# Patient Record
Sex: FEMALE | Race: WHITE | Employment: OTHER | URBAN - NONMETROPOLITAN AREA
[De-identification: names, ages, dates, MRNs, and addresses within clinical notes are randomized per-mention and may not be internally consistent; named-entity substitution may affect disease eponyms.]

---

## 2020-09-15 ENCOUNTER — NURSE ONLY (OUTPATIENT)
Dept: LAB | Age: 75
End: 2020-09-15

## 2020-11-13 ENCOUNTER — ANESTHESIA EVENT (OUTPATIENT)
Dept: OPERATING ROOM | Age: 75
End: 2020-11-13
Payer: MEDICARE

## 2020-11-13 ENCOUNTER — HOSPITAL ENCOUNTER (OUTPATIENT)
Age: 75
Setting detail: OUTPATIENT SURGERY
Discharge: HOME | End: 2020-11-13
Attending: SPECIALIST | Admitting: SPECIALIST
Payer: MEDICARE

## 2020-11-13 ENCOUNTER — ANESTHESIA (OUTPATIENT)
Dept: OPERATING ROOM | Age: 75
End: 2020-11-13
Payer: MEDICARE

## 2020-11-13 VITALS
RESPIRATION RATE: 1 BRPM | OXYGEN SATURATION: 100 % | DIASTOLIC BLOOD PRESSURE: 85 MMHG | SYSTOLIC BLOOD PRESSURE: 156 MMHG

## 2020-11-13 VITALS
HEART RATE: 89 BPM | DIASTOLIC BLOOD PRESSURE: 57 MMHG | BODY MASS INDEX: 24.8 KG/M2 | OXYGEN SATURATION: 96 % | SYSTOLIC BLOOD PRESSURE: 116 MMHG | TEMPERATURE: 97.1 F | RESPIRATION RATE: 14 BRPM | WEIGHT: 158 LBS | HEIGHT: 67 IN

## 2020-11-13 PROCEDURE — 2500000003 HC RX 250 WO HCPCS: Performed by: NURSE ANESTHETIST, CERTIFIED REGISTERED

## 2020-11-13 PROCEDURE — 3600000012 HC SURGERY LEVEL 2 ADDTL 15MIN: Performed by: SPECIALIST

## 2020-11-13 PROCEDURE — 3600000002 HC SURGERY LEVEL 2 BASE: Performed by: SPECIALIST

## 2020-11-13 PROCEDURE — 6370000000 HC RX 637 (ALT 250 FOR IP): Performed by: SPECIALIST

## 2020-11-13 PROCEDURE — 7100000010 HC PHASE II RECOVERY - FIRST 15 MIN: Performed by: SPECIALIST

## 2020-11-13 PROCEDURE — 3700000001 HC ADD 15 MINUTES (ANESTHESIA): Performed by: SPECIALIST

## 2020-11-13 PROCEDURE — 7100000001 HC PACU RECOVERY - ADDTL 15 MIN: Performed by: SPECIALIST

## 2020-11-13 PROCEDURE — 2500000003 HC RX 250 WO HCPCS: Performed by: SPECIALIST

## 2020-11-13 PROCEDURE — 7100000000 HC PACU RECOVERY - FIRST 15 MIN: Performed by: SPECIALIST

## 2020-11-13 PROCEDURE — 7100000011 HC PHASE II RECOVERY - ADDTL 15 MIN: Performed by: SPECIALIST

## 2020-11-13 PROCEDURE — 6360000002 HC RX W HCPCS: Performed by: NURSE ANESTHETIST, CERTIFIED REGISTERED

## 2020-11-13 PROCEDURE — 6370000000 HC RX 637 (ALT 250 FOR IP)

## 2020-11-13 PROCEDURE — 3700000000 HC ANESTHESIA ATTENDED CARE: Performed by: SPECIALIST

## 2020-11-13 PROCEDURE — 88305 TISSUE EXAM BY PATHOLOGIST: CPT

## 2020-11-13 PROCEDURE — 2709999900 HC NON-CHARGEABLE SUPPLY: Performed by: SPECIALIST

## 2020-11-13 PROCEDURE — 2580000003 HC RX 258: Performed by: SPECIALIST

## 2020-11-13 RX ORDER — PRAVASTATIN SODIUM 20 MG
20 TABLET ORAL DAILY
COMMUNITY

## 2020-11-13 RX ORDER — GINSENG 100 MG
CAPSULE ORAL PRN
Status: DISCONTINUED | OUTPATIENT
Start: 2020-11-13 | End: 2020-11-13 | Stop reason: ALTCHOICE

## 2020-11-13 RX ORDER — EPHEDRINE SULFATE/0.9% NACL/PF 50 MG/5 ML
SYRINGE (ML) INTRAVENOUS PRN
Status: DISCONTINUED | OUTPATIENT
Start: 2020-11-13 | End: 2020-11-13 | Stop reason: SDUPTHER

## 2020-11-13 RX ORDER — FENTANYL CITRATE 50 UG/ML
INJECTION, SOLUTION INTRAMUSCULAR; INTRAVENOUS PRN
Status: DISCONTINUED | OUTPATIENT
Start: 2020-11-13 | End: 2020-11-13 | Stop reason: SDUPTHER

## 2020-11-13 RX ORDER — LIDOCAINE HYDROCHLORIDE AND EPINEPHRINE 10; 10 MG/ML; UG/ML
INJECTION, SOLUTION INFILTRATION; PERINEURAL PRN
Status: DISCONTINUED | OUTPATIENT
Start: 2020-11-13 | End: 2020-11-13 | Stop reason: ALTCHOICE

## 2020-11-13 RX ORDER — CHLORAL HYDRATE 500 MG
CAPSULE ORAL
COMMUNITY

## 2020-11-13 RX ORDER — ONDANSETRON 2 MG/ML
INJECTION INTRAMUSCULAR; INTRAVENOUS PRN
Status: DISCONTINUED | OUTPATIENT
Start: 2020-11-13 | End: 2020-11-13 | Stop reason: SDUPTHER

## 2020-11-13 RX ORDER — LIDOCAINE HYDROCHLORIDE 20 MG/ML
INJECTION, SOLUTION EPIDURAL; INFILTRATION; INTRACAUDAL; PERINEURAL PRN
Status: DISCONTINUED | OUTPATIENT
Start: 2020-11-13 | End: 2020-11-13 | Stop reason: SDUPTHER

## 2020-11-13 RX ORDER — PROPOFOL 10 MG/ML
INJECTION, EMULSION INTRAVENOUS PRN
Status: DISCONTINUED | OUTPATIENT
Start: 2020-11-13 | End: 2020-11-13 | Stop reason: SDUPTHER

## 2020-11-13 RX ORDER — CEFADROXIL 500 MG/1
500 CAPSULE ORAL 2 TIMES DAILY
COMMUNITY

## 2020-11-13 RX ORDER — SUCCINYLCHOLINE/SOD CL,ISO/PF 200MG/10ML
SYRINGE (ML) INTRAVENOUS PRN
Status: DISCONTINUED | OUTPATIENT
Start: 2020-11-13 | End: 2020-11-13 | Stop reason: SDUPTHER

## 2020-11-13 RX ORDER — CLINDAMYCIN PHOSPHATE 600 MG/50ML
INJECTION INTRAVENOUS PRN
Status: DISCONTINUED | OUTPATIENT
Start: 2020-11-13 | End: 2020-11-13 | Stop reason: SDUPTHER

## 2020-11-13 RX ORDER — TRAMADOL HYDROCHLORIDE 50 MG/1
50 TABLET ORAL ONCE
Status: COMPLETED | OUTPATIENT
Start: 2020-11-13 | End: 2020-11-13

## 2020-11-13 RX ORDER — SODIUM CHLORIDE 9 MG/ML
INJECTION, SOLUTION INTRAVENOUS
Status: COMPLETED | OUTPATIENT
Start: 2020-11-13 | End: 2020-11-13

## 2020-11-13 RX ORDER — CALCIUM CARBONATE 500(1250)
500 TABLET ORAL DAILY
COMMUNITY

## 2020-11-13 RX ORDER — PHENYLEPHRINE HYDROCHLORIDE 10 MG/ML
INJECTION INTRAVENOUS PRN
Status: DISCONTINUED | OUTPATIENT
Start: 2020-11-13 | End: 2020-11-13 | Stop reason: SDUPTHER

## 2020-11-13 RX ORDER — TRAMADOL HYDROCHLORIDE 50 MG/1
TABLET ORAL
Status: COMPLETED
Start: 2020-11-13 | End: 2020-11-13

## 2020-11-13 RX ORDER — PHENOBARBITAL 15 MG/1
100 TABLET ORAL ONCE
COMMUNITY

## 2020-11-13 RX ORDER — AMPICILLIN TRIHYDRATE 250 MG
CAPSULE ORAL
COMMUNITY

## 2020-11-13 RX ORDER — LISINOPRIL 20 MG/1
20 TABLET ORAL DAILY
COMMUNITY

## 2020-11-13 RX ORDER — OMEGA-3S/DHA/EPA/FISH OIL/D3 300MG-1000
400 CAPSULE ORAL DAILY
COMMUNITY

## 2020-11-13 RX ADMIN — FENTANYL CITRATE 25 MCG: 50 INJECTION, SOLUTION INTRAMUSCULAR; INTRAVENOUS at 11:35

## 2020-11-13 RX ADMIN — SODIUM CHLORIDE: 9 INJECTION, SOLUTION INTRAVENOUS at 11:02

## 2020-11-13 RX ADMIN — CLINDAMYCIN PHOSPHATE 600 MG: 600 INJECTION, SOLUTION INTRAVENOUS at 11:10

## 2020-11-13 RX ADMIN — PHENYLEPHRINE HYDROCHLORIDE 150 MCG: 10 INJECTION INTRAVENOUS at 11:22

## 2020-11-13 RX ADMIN — TRAMADOL HYDROCHLORIDE 50 MG: 50 TABLET ORAL at 13:15

## 2020-11-13 RX ADMIN — Medication 10 MG: at 11:27

## 2020-11-13 RX ADMIN — LIDOCAINE HYDROCHLORIDE 50 MG: 20 INJECTION, SOLUTION EPIDURAL; INFILTRATION; INTRACAUDAL; PERINEURAL at 11:04

## 2020-11-13 RX ADMIN — Medication 120 MG: at 11:05

## 2020-11-13 RX ADMIN — PROPOFOL 160 MG: 10 INJECTION, EMULSION INTRAVENOUS at 11:04

## 2020-11-13 RX ADMIN — ONDANSETRON HYDROCHLORIDE 4 MG: 4 INJECTION, SOLUTION INTRAMUSCULAR; INTRAVENOUS at 11:27

## 2020-11-13 RX ADMIN — Medication 10 MG: at 11:30

## 2020-11-13 RX ADMIN — PHENYLEPHRINE HYDROCHLORIDE 150 MCG: 10 INJECTION INTRAVENOUS at 11:24

## 2020-11-13 RX ADMIN — FENTANYL CITRATE 50 MCG: 50 INJECTION, SOLUTION INTRAMUSCULAR; INTRAVENOUS at 11:04

## 2020-11-13 SDOH — HEALTH STABILITY: MENTAL HEALTH: HOW OFTEN DO YOU HAVE A DRINK CONTAINING ALCOHOL?: NEVER

## 2020-11-13 ASSESSMENT — PULMONARY FUNCTION TESTS
PIF_VALUE: 12
PIF_VALUE: 3
PIF_VALUE: 1
PIF_VALUE: 13
PIF_VALUE: 18
PIF_VALUE: 13
PIF_VALUE: 18
PIF_VALUE: 12
PIF_VALUE: 0
PIF_VALUE: 12
PIF_VALUE: 34
PIF_VALUE: 14
PIF_VALUE: 19
PIF_VALUE: 13
PIF_VALUE: 13
PIF_VALUE: 1
PIF_VALUE: 13
PIF_VALUE: 9
PIF_VALUE: 12
PIF_VALUE: 1
PIF_VALUE: 13
PIF_VALUE: 12
PIF_VALUE: 9
PIF_VALUE: 2
PIF_VALUE: 3
PIF_VALUE: 15
PIF_VALUE: 12
PIF_VALUE: 12
PIF_VALUE: 4
PIF_VALUE: 12
PIF_VALUE: 12
PIF_VALUE: 16
PIF_VALUE: 12
PIF_VALUE: 13
PIF_VALUE: 12
PIF_VALUE: 2
PIF_VALUE: 12
PIF_VALUE: 13
PIF_VALUE: 13
PIF_VALUE: 1
PIF_VALUE: 12
PIF_VALUE: 13
PIF_VALUE: 12
PIF_VALUE: 0
PIF_VALUE: 15
PIF_VALUE: 16
PIF_VALUE: 1
PIF_VALUE: 12
PIF_VALUE: 10
PIF_VALUE: 12
PIF_VALUE: 10
PIF_VALUE: 13
PIF_VALUE: 13
PIF_VALUE: 20

## 2020-11-13 ASSESSMENT — PAIN - FUNCTIONAL ASSESSMENT: PAIN_FUNCTIONAL_ASSESSMENT: 0-10

## 2020-11-13 ASSESSMENT — PAIN SCALES - GENERAL: PAINLEVEL_OUTOF10: 4

## 2020-11-13 NOTE — ANESTHESIA PRE PROCEDURE
Department of Anesthesiology  Preprocedure Note       Name:  Shaista Hawk   Age:  66 y.o.  :  1945                                          MRN:  [id number]         Date:  2020      Surgeon: Ashley Gar):  Violeta Quiles MD    Procedure: Procedure(s):  MOHS REPAIR SCC RIGHT PARIETAL SCALP AND BX LEFT LATERAL LOWER CHEEK    Medications prior to admission:   Prior to Admission medications    Medication Sig Start Date End Date Taking? Authorizing Provider   PHENobarbital (LUMINAL) 15 MG tablet Take 100 mg by mouth once. Yes Historical Provider, MD   lisinopril (PRINIVIL;ZESTRIL) 20 MG tablet Take 20 mg by mouth daily   Yes Historical Provider, MD   pravastatin (PRAVACHOL) 20 MG tablet Take 20 mg by mouth daily   Yes Historical Provider, MD   vitamin D3 (CHOLECALCIFEROL) 10 MCG (400 UNIT) TABS tablet Take 400 Units by mouth daily   Yes Historical Provider, MD   Duke-3 1000 MG CAPS Take by mouth   Yes Historical Provider, MD   calcium carbonate (OSCAL) 500 MG TABS tablet Take 500 mg by mouth daily   Yes Historical Provider, MD   Red Yeast Rice 600 MG CAPS Take by mouth   Yes Historical Provider, MD   cefadroxil (DURICEF) 500 MG capsule Take 500 mg by mouth 2 times daily   Yes Historical Provider, MD       Current medications:    Current Facility-Administered Medications   Medication Dose Route Frequency Provider Last Rate Last Dose    0.9 % sodium chloride infusion   Intravenous On Call to 56 Rutanja Gupta MD        ceFAZolin (ANCEF) 2 g in dextrose 5 % 50 mL IVPB  2 g Intravenous On Call to 56 Rutanja Gupta MD           Allergies: Allergies   Allergen Reactions    Dilantin [Phenytoin] Swelling    Penicillins     Sulfa Antibiotics     Vicodin [Hydrocodone-Acetaminophen]        Problem List:  There is no problem list on file for this patient.       Past Medical History:        Diagnosis Date    Arthritis     Epilepsy ([facility] [address]     Hyperlipidemia     Hypertension        Past Surgical History:        Procedure Laterality Date    CHOLECYSTECTOMY      COLONOSCOPY      HYSTERECTOMY         Social History:    Social History     Tobacco Use    Smoking status: Never Smoker    Smokeless tobacco: Never Used   Substance Use Topics    Alcohol use: Not Currently     Frequency: Never                                Counseling given: Not Answered      Vital Signs (Current):   Vitals:    11/13/20 0945   BP: (!) 148/70   Pulse: 104   Resp: 16   Temp: 97.3 °F (36.3 °C)   TempSrc: Temporal   SpO2: 97%   Weight: 158 lb (71.7 kg)   Height: 5' 7\" (1.702 m)                                              BP Readings from Last 3 Encounters:   11/13/20 (!) 148/70       NPO Status: Time of last liquid consumption: 1800                        Time of last solid consumption: 1800                        Date of last liquid consumption: 11/12/20                        Date of last solid food consumption: 11/12/20    BMI:   Wt Readings from Last 3 Encounters:   11/13/20 158 lb (71.7 kg)     Body mass index is 24.75 kg/m². CBC:   Lab Results   Component Value Date    WBC 5.0 11/08/2011    RBC 3.43 11/08/2011    HCT 38.2 11/09/2011    .0 11/08/2011    RDW 13.8 11/08/2011     11/08/2011       CMP:   Lab Results   Component Value Date     11/08/2011    K 3.9 11/08/2011     11/08/2011    CO2 25 11/08/2011    BUN 5 11/08/2011    CREATININE 0.4 11/08/2011    LABGLOM >90 11/08/2011    GLUCOSE 116 11/08/2011    PROT 6.8 11/08/2011    CALCIUM 9.5 11/08/2011    BILITOT 0.3 11/08/2011    ALKPHOS 79 11/08/2011    AST 21 11/08/2011    ALT 7 11/08/2011       POC Tests: No results for input(s): POCGLU, POCNA, POCK, POCCL, POCBUN, POCHEMO, POCHCT in the last 72 hours.     Coags: No results found for: PROTIME, INR, APTT    HCG (If Applicable): No results found for: PREGTESTUR, PREGSERUM, HCG, HCGQUANT     ABGs: No results found for: PHART, PO2ART, URJ8FFN, APG8HRE, BEART, D7NSNXNR     Type & Screen (If Applicable):  No results found for: LABABO, LABRH    Drug/Infectious Status (If Applicable):  No results found for: HIV, HEPCAB    COVID-19 Screening (If Applicable): No results found for: COVID19      Anesthesia Evaluation    Airway: Mallampati: II       Mouth opening: > = 3 FB Dental:          Pulmonary:                              Cardiovascular:    (+) hypertension:,                   Neuro/Psych:               GI/Hepatic/Renal:             Endo/Other:                     Abdominal:           Vascular:                                        Anesthesia Plan      general     ASA 2       Induction: intravenous. Anesthetic plan and risks discussed with patient. Plan discussed with CRNA.                   Migue Travis MD   11/13/2020

## 2020-11-13 NOTE — PROGRESS NOTES
0- Pt arrived to PACU Laurence Mineshlise JULIA and Kelli Longo RN, pt hooked up to monitor, VSS, pt breathing deeply on room air. SCD s hooked up. Pt has ace wrap dressing to her head dry and intact pt has band aid to left cheek on her face dry and intact. 1204- PT denies pain  1207- Pt doing well denies needs, updated be here about 20 min then will move to phase 2.  1214- Pt doing well denies needs. 1217- Pt given more ice chips. 1218- Joanne SUÁREZ at bedside discussed prescribing pain med tramadol for pain  1228- Pt meets discharge criteria from phase 1  1229- PT to phase 2.   1255- Pt doing well denies needs, encouraged to eat a cracker Joanne wanted her to try a pain pill before she leaves. 1257- Pt updated head was not shaved per Mirian Marquez in [address].  1340- Pt doing well, tolerating PO pain pill with no issues. Head still little sore. 1341- IV removed pt getting dressed. 1349- PT given discharge instructions verbalized understanding.  Last dose of pain meds given and wrote on AVS.  1353- Pt discharged taken to car in wheelchair with this RN

## 2020-11-13 NOTE — OP NOTE
Operative Note    Patient name: [address] [place] [address] Record Number: [id number]    Primary Care Physician: Georgette ROBBINS 1945    Date of Procedure: 2020    Pre-operative Diagnosis:  1.  27cm2 defect of right parietal area s/p MOHS for basal cell carcinoma       2.  supsicious lesion of left lower cheek    Post-operative Diagnosis: Same    Procedure Performed:  1. Repair of right parietal defect with an adjacent tissue transfer (200 cm2) ([id number] & 14302 X 5)       2.  1cm shave biopsy of left lower cheek    Surgeons/Assistants: Dr. Naomia Opitz PA-C    Estimated Blood Loss: 73SW     Complications: none immediately appreciated    Procedure: With the patient lying in the supine position and under adequate anesthesia per the anesthesia team, the area was anesthetized with a total of 17 ml of 1% Lidocaine 1:100,000 with epinephrine solution. The area was then prepped and draped in the standard surgical fashion. This was a very sizeable defect, which could not be closed primarily. Therefore, an adjacent tissue transfer (bilateral rotation flaps) were then designed, elevated and inset with skin staples. The Burrow's triangles were resected to prevent dog-ear deformity and final closure was completed using bacitracin and bulky sterile dressings. The left lower cheek shave biopsy was performed and then covered with bacitracin/bandaid. The patient tolerated the procedure quite well and remained hemodynamically stable throughout the procedure and was quite comfortable throughout the operative course.     Clinical staging for cancer cases:  Ct  Cn  Cm    Benjiman Setting  Electronically signed by me on 2020 at 11:59 AM  Operative Note      Patient: Frida Nettles  YOB: 1945  MRN: [id number]    Date of Procedure: 2020    Pre-Op Diagnosis: SCC RIGHT PARIETAL SCALP    Post-Op Diagnosis: Same       Procedure(s):  MOHS REPAIR SCC RIGHT PARIETAL SCALP AND BX LEFT LATERAL LOWER CHEEK    Surgeon(s):  Anika Henson MD    Assistant:   Physician Assistant: Kristy Phelan PA-C    Anesthesia: General    Estimated Blood Loss (mL): Minimal    Complications: None    Specimens:   ID Type Source Tests Collected by Time Destination   A : Left lateral lower cheek biopsy Tissue Cheek SURGICAL PATHOLOGY Anika Henson MD 11/13/2020 1028        Implants:  * No implants in log *      Drains: * No LDAs found *    Findings: 1.  27cm2 defect of right parietal area s/p MOHS for basal cell carcinoma       2.  supsicious lesion of left lower cheek    Detailed Description of Procedure:   1.   Repair of right parietal defect with an adjacent tissue transfer (200 cm2) ([id number] & 14302 X 5)       2.  1cm shave biopsy of left lower cheek    Electronically signed by Anika Henson MD on 11/13/2020 at 11:59 AM

## 2021-04-02 ENCOUNTER — HOSPITAL ENCOUNTER (OUTPATIENT)
Age: 76
Setting detail: OUTPATIENT SURGERY
Discharge: HOME | End: 2021-04-02
Attending: SPECIALIST | Admitting: SPECIALIST
Payer: MEDICARE

## 2021-04-02 VITALS
RESPIRATION RATE: 12 BRPM | OXYGEN SATURATION: 99 % | HEART RATE: 80 BPM | SYSTOLIC BLOOD PRESSURE: 130 MMHG | WEIGHT: 158 LBS | TEMPERATURE: 97.5 F | BODY MASS INDEX: 26.33 KG/M2 | DIASTOLIC BLOOD PRESSURE: 60 MMHG | HEIGHT: 65 IN

## 2021-04-02 PROCEDURE — 3600000002 HC SURGERY LEVEL 2 BASE: Performed by: SPECIALIST

## 2021-04-02 PROCEDURE — 2500000003 HC RX 250 WO HCPCS: Performed by: SPECIALIST

## 2021-04-02 PROCEDURE — 7100000011 HC PHASE II RECOVERY - ADDTL 15 MIN: Performed by: SPECIALIST

## 2021-04-02 PROCEDURE — 7100000010 HC PHASE II RECOVERY - FIRST 15 MIN: Performed by: SPECIALIST

## 2021-04-02 PROCEDURE — 2709999900 HC NON-CHARGEABLE SUPPLY: Performed by: SPECIALIST

## 2021-04-02 PROCEDURE — 3600000012 HC SURGERY LEVEL 2 ADDTL 15MIN: Performed by: SPECIALIST

## 2021-04-02 RX ORDER — LIDOCAINE HYDROCHLORIDE AND EPINEPHRINE 10; 10 MG/ML; UG/ML
INJECTION, SOLUTION INFILTRATION; PERINEURAL PRN
Status: DISCONTINUED | OUTPATIENT
Start: 2021-04-02 | End: 2021-04-02 | Stop reason: ALTCHOICE

## 2021-04-02 ASSESSMENT — PAIN SCALES - GENERAL: PAINLEVEL_OUTOF10: 0

## 2021-04-02 NOTE — PROGRESS NOTES
1322-  Patient arrived to phase II via via chair. Spontaneous respirations even and unlabored. Placed on monitor--VSS. Report received from [facility].    2520-  Assessment completed. Patient is alert and oriented x4. IV capped off-- no complications. ACE wrap to patient's head--clean, dry, and intact. Patient denies pain--will monitor. 1325-  Patient denies snack or drink. Family in room. 1335-  Patient dressing. 1340-  Discharge instructions given. Understanding verbalized. 1345-  Patient dressing. [phone]-  Patient discharged in stable condition with all belongings.

## 2021-04-02 NOTE — OP NOTE
Operative Note    Patient name: [address] [place] [address] Record Number: [id number]    Primary Care Physician: Kevin ROBBINS 1945    Date of Procedure: 2021    Pre-operative Diagnosis: 4cm2 defect of left lateral lower cheek s/p MOHS for basal cell carcinoma    Post-operative Diagnosis: Same    Procedure Performed: Repair with an adjacent tissue transfer (12 cm2) (CPT 52227)    Surgeons/Assistants: Dr. Aditi Leroy PA-C/PATRICIA Patino DPM    Estimated Blood Loss: 5ml     Complications: none immediately appreciated    Procedure: With the patient lying in the supine position after having anesthetized the area with a total of 23 ml of 1% Lidocaine 1:100,000 with epinephrine solution, the area was then prepped  draped in the standard surgical fashion. There was a sizeable defect, which could not be closed primarily. Therefore, after the beveled/cauterized edges were debrided to healthy appearing tissue an adjacent tissue transfer (rotational flap) was then designed, elevated and inset with 4-0 Monocryl suture placed in interrupted buried fashion. The Burrow's triangles were resected to prevent dog-ear deformity and final closure was completed using 5-0 fast absorbing, bacitracin with bulky sterile head wrap. The patient tolerated the procedure quite well and remained hemodynamically stable throughout the procedure and was quite comfortable throughout the operative course.     Clinical staging for cancer cases:  Ct  Cn  Blaise Capone  Electronically signed by me on 2021 at 1:07 PMOperative Note      Patient: Malathi Noel  YOB: 1945  MRN: [id number]    Date of Procedure: 2021    Pre-Op Diagnosis: BCC LEFT LATERAL LOWER CHEEK    Post-Op Diagnosis: Same       Procedure(s):  MOHS DEFECT REPAIR [address] [employer] LEFT LATERAL LOWER CHEEK    Surgeon(s):  Jonathon Capone MD    Assistant:   Physician Assistant: Usha Barker PA-C  Resident: Lyudmila Stewart JULIANA Patino    Anesthesia: Local    Estimated Blood Loss (mL): Minimal    Complications: None    Specimens:   * No specimens in log *    Implants:  * No implants in log *      Drains: * No LDAs found *    Findings: 4cm2 defect of left lateral lower cheek s/p MOHS for basal cell carcinoma    Detailed Description of Procedure:   Repair with an adjacent tissue transfer (12 cm2) (CPT 04938)    Electronically signed by Jonathon Capone MD on 4/2/2021 at 1:07 PM

## 2024-12-03 ENCOUNTER — HOSPITAL ENCOUNTER (OUTPATIENT)
Dept: INFUSION THERAPY | Age: 79
Discharge: HOME | End: 2024-12-03
Payer: MEDICARE

## 2024-12-03 ENCOUNTER — OFFICE VISIT (OUTPATIENT)
Dept: ONCOLOGY | Age: 79
End: 2024-12-03
Payer: MEDICARE

## 2024-12-03 VITALS
RESPIRATION RATE: 16 BRPM | SYSTOLIC BLOOD PRESSURE: 142 MMHG | HEART RATE: 65 BPM | TEMPERATURE: 98 F | OXYGEN SATURATION: 95 % | DIASTOLIC BLOOD PRESSURE: 69 MMHG

## 2024-12-03 VITALS
SYSTOLIC BLOOD PRESSURE: 142 MMHG | HEIGHT: 66 IN | WEIGHT: 172 LBS | DIASTOLIC BLOOD PRESSURE: 69 MMHG | HEART RATE: 65 BPM | OXYGEN SATURATION: 95 % | TEMPERATURE: 98 F | RESPIRATION RATE: 16 BRPM | BODY MASS INDEX: 27.64 KG/M2

## 2024-12-03 DIAGNOSIS — D72.818 OTHER DECREASED WHITE BLOOD CELL (WBC) COUNT: ICD-10-CM

## 2024-12-03 DIAGNOSIS — D53.9 MACROCYTIC ANEMIA: Primary | ICD-10-CM

## 2024-12-03 DIAGNOSIS — D53.9 MACROCYTIC ANEMIA: ICD-10-CM

## 2024-12-03 LAB
ALBUMIN SERPL BCG-MCNC: 4.4 G/DL (ref 3.5–5.1)
ALP SERPL-CCNC: 90 U/L (ref 38–126)
ALT SERPL W/O P-5'-P-CCNC: < 5 U/L (ref 11–66)
AST SERPL-CCNC: 13 U/L (ref 5–40)
BASOPHILS # BLD AUTO: 0 THOU/MM3 (ref 0–0.1)
BASOPHILS NFR BLD AUTO: 1 % (ref 0–3)
BILIRUB CONJ SERPL-MCNC: < 0.1 MG/DL (ref 0.1–13.8)
BILIRUB SERPL-MCNC: 0.2 MG/DL (ref 0.3–1.2)
BUN BLDP-MCNC: 17 MG/DL (ref 8–26)
CHLORIDE BLD-SCNC: 104 MEQ/L (ref 98–109)
CREAT BLD-MCNC: 0.7 MG/DL (ref 0.5–1.2)
EOSINOPHIL # BLD AUTO: 0.1 THOU/MM3 (ref 0–0.4)
EOSINOPHIL NFR BLD AUTO: 2 % (ref 0–4)
ERYTHROCYTE [DISTWIDTH] IN BLOOD BY AUTOMATED COUNT: 13.5 % (ref 11.5–14.5)
FOLATE SERPL-MCNC: 5.2 NG/ML (ref 4.8–24.2)
GFR SERPL CREATININE-BSD FRML MDRD: 86 ML/MIN/1.73M2
GLUCOSE BLD-MCNC: 95 MG/DL (ref 70–108)
HCT VFR BLD AUTO: 38.2 % (ref 37–47)
HGB BLD-MCNC: 12.6 GM/DL (ref 12–16)
IMM GRANULOCYTES # BLD AUTO: 0 THOU/MM3 (ref 0–0.07)
IMM GRANULOCYTES NFR BLD AUTO: 0 %
IONIZED CALCIUM, WHOLE BLOOD: 1.24 MMOL/L (ref 1.12–1.32)
IRON SATN MFR SERPL: 40 % (ref 20–50)
IRON SERPL-MCNC: 108 UG/DL (ref 50–170)
LDH SERPL L TO P-CCNC: 169 U/L (ref 100–190)
LYMPHOCYTES # BLD AUTO: 1 THOU/MM3 (ref 1–4.8)
LYMPHOCYTES NFR BLD AUTO: 30 % (ref 15–47)
MCH RBC QN AUTO: 35.3 PG (ref 26–33)
MCHC RBC AUTO-ENTMCNC: 33 GM/DL (ref 32.2–35.5)
MCV RBC AUTO: 107 FL (ref 81–99)
MONOCYTES # BLD AUTO: 0.3 THOU/MM3 (ref 0.4–1.3)
MONOCYTES NFR BLD AUTO: 8 % (ref 0–12)
NEUTROPHILS ABSOLUTE: 1.9 THOU/MM3 (ref 1.8–7.7)
NEUTROPHILS NFR BLD AUTO: 59 % (ref 43–75)
PLATELET # BLD AUTO: 270 THOU/MM3 (ref 130–400)
PMV BLD AUTO: 10.8 FL (ref 9.4–12.4)
POTASSIUM BLD-SCNC: 4.5 MEQ/L (ref 3.5–4.9)
PROT SERPL-MCNC: 7.3 G/DL (ref 6.1–8)
RBC # BLD AUTO: 3.57 MILL/MM3 (ref 4.2–5.4)
RHEUMATOID FACT SERPL-ACNC: 12 IU/ML (ref 0–13)
SODIUM BLD-SCNC: 141 MEQ/L (ref 138–146)
TIBC SERPL-MCNC: 269 UG/DL (ref 171–450)
TOTAL CO2, WHOLE BLOOD: 32 MEQ/L (ref 23–33)
VIT B12 SERPL-MCNC: 976 PG/ML (ref 211–911)
WBC # BLD AUTO: 3.3 THOU/MM3 (ref 4.8–10.8)

## 2024-12-03 PROCEDURE — 84165 PROTEIN E-PHORESIS SERUM: CPT

## 2024-12-03 PROCEDURE — 1036F TOBACCO NON-USER: CPT | Performed by: NURSE PRACTITIONER

## 2024-12-03 PROCEDURE — 99204 OFFICE O/P NEW MOD 45 MIN: CPT | Performed by: NURSE PRACTITIONER

## 2024-12-03 PROCEDURE — 85025 COMPLETE CBC W/AUTO DIFF WBC: CPT

## 2024-12-03 PROCEDURE — G8419 CALC BMI OUT NRM PARAM NOF/U: HCPCS | Performed by: NURSE PRACTITIONER

## 2024-12-03 PROCEDURE — 86038 ANTINUCLEAR ANTIBODIES: CPT

## 2024-12-03 PROCEDURE — 83550 IRON BINDING TEST: CPT

## 2024-12-03 PROCEDURE — 83010 ASSAY OF HAPTOGLOBIN QUANT: CPT

## 2024-12-03 PROCEDURE — 82525 ASSAY OF COPPER: CPT

## 2024-12-03 PROCEDURE — 86334 IMMUNOFIX E-PHORESIS SERUM: CPT

## 2024-12-03 PROCEDURE — 84155 ASSAY OF PROTEIN SERUM: CPT

## 2024-12-03 PROCEDURE — 82607 VITAMIN B-12: CPT

## 2024-12-03 PROCEDURE — 88184 FLOWCYTOMETRY/ TC 1 MARKER: CPT

## 2024-12-03 PROCEDURE — 1090F PRES/ABSN URINE INCON ASSESS: CPT | Performed by: NURSE PRACTITIONER

## 2024-12-03 PROCEDURE — 83615 LACTATE (LD) (LDH) ENZYME: CPT

## 2024-12-03 PROCEDURE — 82746 ASSAY OF FOLIC ACID SERUM: CPT

## 2024-12-03 PROCEDURE — 99211 OFF/OP EST MAY X REQ PHY/QHP: CPT

## 2024-12-03 PROCEDURE — 82728 ASSAY OF FERRITIN: CPT

## 2024-12-03 PROCEDURE — 1123F ACP DISCUSS/DSCN MKR DOCD: CPT | Performed by: NURSE PRACTITIONER

## 2024-12-03 PROCEDURE — 1126F AMNT PAIN NOTED NONE PRSNT: CPT | Performed by: NURSE PRACTITIONER

## 2024-12-03 PROCEDURE — 83540 ASSAY OF IRON: CPT

## 2024-12-03 PROCEDURE — G8399 PT W/DXA RESULTS DOCUMENT: HCPCS | Performed by: NURSE PRACTITIONER

## 2024-12-03 PROCEDURE — G8484 FLU IMMUNIZE NO ADMIN: HCPCS | Performed by: NURSE PRACTITIONER

## 2024-12-03 PROCEDURE — G8427 DOCREV CUR MEDS BY ELIG CLIN: HCPCS | Performed by: NURSE PRACTITIONER

## 2024-12-03 PROCEDURE — 80076 HEPATIC FUNCTION PANEL: CPT

## 2024-12-03 PROCEDURE — 88185 FLOWCYTOMETRY/TC ADD-ON: CPT

## 2024-12-03 PROCEDURE — 80047 BASIC METABLC PNL IONIZED CA: CPT

## 2024-12-03 PROCEDURE — 86430 RHEUMATOID FACTOR TEST QUAL: CPT

## 2024-12-03 PROCEDURE — 82784 ASSAY IGA/IGD/IGG/IGM EACH: CPT

## 2024-12-03 PROCEDURE — 83883 ASSAY NEPHELOMETRY NOT SPEC: CPT

## 2024-12-03 RX ORDER — DONEPEZIL HYDROCHLORIDE 10 MG/1
10 TABLET, FILM COATED ORAL DAILY
COMMUNITY
Start: 2024-11-13

## 2024-12-03 RX ORDER — ACETAMINOPHEN 500 MG
500 TABLET ORAL EVERY 6 HOURS PRN
COMMUNITY

## 2024-12-03 RX ORDER — POLYETHYLENE GLYCOL 3350 17 G/17G
17 POWDER, FOR SOLUTION ORAL DAILY PRN
COMMUNITY

## 2024-12-03 RX ORDER — MELOXICAM 15 MG/1
15 TABLET ORAL DAILY
COMMUNITY
Start: 2024-11-22

## 2024-12-03 RX ORDER — CITALOPRAM HYDROBROMIDE 10 MG/1
10 TABLET ORAL DAILY
COMMUNITY
Start: 2024-11-22

## 2024-12-03 RX ORDER — OMEPRAZOLE 40 MG/1
40 CAPSULE, DELAYED RELEASE ORAL DAILY
COMMUNITY
Start: 2024-10-22

## 2024-12-03 RX ORDER — ASPIRIN 81 MG/1
81 TABLET ORAL DAILY
COMMUNITY

## 2024-12-03 RX ORDER — IBUPROFEN 400 MG/1
400 TABLET, FILM COATED ORAL EVERY 6 HOURS PRN
COMMUNITY

## 2024-12-03 NOTE — PROGRESS NOTES
[facility] [place] [facility]  [facility]  [address]  [address]  [facility] [address]  Dept: [phone]  Loc: [phone]       Visit Date:12/3/2024     Celina Boss is a 79 y.o. female who presents today for:   Chief Complaint   Patient presents with    Follow-up     Macrocytic anemia        HPI:   Celina Boss is a 79 y.o. female referred to Hematology/Oncology clinic by Dr. Plascencia for evaluation of vitamin B12 deficiency and elevated MCV.  The patient has a history of seizure disorder, memory impairment, HTN, hyperlipidemia, osteopenia, osteoarthritis and GERD.  She is s/p hysterectomy.    11/08/2011 WBC 5.0, Hgb 12.1, HCT 35.6%, , RDW 13.8% and platelet count 222,000.    04/04/2023 WBC 4.3, Hgb 10.5, HCT 31% .9, RDW 13.8% and platelet count 261,000.    10/23/2023 vitamin B12 level 201.  WBC 4.2, Hgb 12.6, HCT 38.5%, .2, RDW 13% and platelet count 281,000.    02/03/2024 vitamin B12 level 410.  WBC 3.1, Hgb 12.7, HCT 38.6%, , RDW 13% and platelet count 281,000.    07/31/2024 WBC 3.2, Hgb 11.3, HCT 34.6%, .2, RDW 13.9% and platelet count 241,000.    11/07/2024 GFR 96, BUN 13 and creatinine 0.6.  Hepatic function panel results within normal limits.  WBC 2.9, Hgb 12.1, HCT 36.3%, .7, RDW 13.6% and platelet count 240,000.  Folate level within normal limits.    The patient is a current resident at [place].  She uses a cane/walker to assist with mobility.  She denies any headaches, dizziness or vision changes.  No chest pain, heart palpitations, cough or SOB.  She complains of occasional abdominal pain and diarrhea.  She has chronic joint pain and muscle weakness.  She denies any fever or drenching night sweats.  No recent falls or trauma.    GFR 86, BUN 17 and creatinine 0.7.  Ionized calcium level within normal limits. Iron level 108, TIBC 269, iron saturation 40% and ferritin level 89.  Folate and vitamin B12

## 2024-12-04 LAB
FERRITIN SERPL IA-MCNC: 89 NG/ML (ref 10–291)
FREE KAPPA/LAMBDA RATIO: 1.2 (ref 0.22–1.74)
HAPTOGLOB SERPL-MCNC: 76 MG/DL (ref 30–200)
IGA SERPL-MCNC: 365 MG/DL (ref 70–400)
IGG SERPL-MCNC: 1063 MG/DL (ref 700–1600)
IGM SERPL-MCNC: 60 MG/DL (ref 40–230)
KAPPA LC FREE SER-MCNC: 23.6 MG/L
LAMBDA LC FREE SERPL-MCNC: 19.6 MG/L (ref 4.2–27.7)
REVIEWED BY: NORMAL
SMEAR REVIEW: NORMAL

## 2024-12-05 LAB — COPPER SERPL-MCNC: 114.4 UG/DL (ref 80–155)

## 2024-12-06 LAB
LEUK/LYMPH PHENOTYPING WB: NORMAL
NUCLEAR IGG SER QL IA: NORMAL

## 2024-12-08 LAB — IMMUNOFIXATION WITH QUANT: NORMAL

## 2024-12-10 DIAGNOSIS — D53.9 MACROCYTIC ANEMIA: Primary | ICD-10-CM

## 2024-12-12 ENCOUNTER — CLINICAL DOCUMENTATION (OUTPATIENT)
Dept: ONCOLOGY | Age: 79
End: 2024-12-12

## 2024-12-12 NOTE — PROGRESS NOTES
GFR 88, BUN 17 and creatinine 0.7.  Ionized calcium level and hepatic function panel results within normal limits.  Copper level within normal limits.  LDH and haptoglobin levels within normal limits.  Rheumatoid factor within normal limits.  GEORGI screen was negative.  LDH and haptoglobin level within normal limits.  Vitamin B12 and folate levels within normal limits.  Iron level 108, TIBC 269, iron saturation 40% and ferritin level 89.    Peripheral flow cytometry results show increased CD4:CD8 ratio (10:1) without immunophenotypic aberrancy.  These findings may be observed in Hodgkin lymphomas, autoimmune disorders, neoplasms, sarcoidosis, and other conditions.     Kappa/lambda free light chain ratio within normal limits.  SPEP/KERI interpretation shows a normal SPEP pattern, no monoclonal protein seen.  IgG, IgA and IgM levels within normal limits.    Called the patient's daughter, Rosey to discuss lab results and further recommendations.  The patient was recommended repeat labs to determine stability.  She will be seen for follow-up on 04/24/2025 along with her .  Rosey was instructed to notify the office of any new or worsening symptoms.  She verbalized an understanding.

## 2025-01-23 ENCOUNTER — TELEPHONE (OUTPATIENT)
Dept: ONCOLOGY | Age: 80
End: 2025-01-23

## 2025-01-23 NOTE — TELEPHONE ENCOUNTER
Provider from [facility] faxed to ask if there is any concern that the Phenobarb she has been taking since 1985 is contributing to any of her hematological problems vs it being related to another problem.    Thank you

## 2025-02-19 ENCOUNTER — TELEPHONE (OUTPATIENT)
Dept: ONCOLOGY | Age: 80
End: 2025-02-19

## 2025-02-19 ENCOUNTER — CLINICAL DOCUMENTATION (OUTPATIENT)
Dept: ONCOLOGY | Age: 80
End: 2025-02-19

## 2025-02-19 LAB
ALBUMIN: 3.8 G/DL
ALP BLD-CCNC: 72 U/L
ALT SERPL-CCNC: 3 U/L
ANION GAP SERPL CALCULATED.3IONS-SCNC: ABNORMAL MMOL/L
AST SERPL-CCNC: 10 U/L
BILIRUB SERPL-MCNC: 0.5 MG/DL (ref 0.1–1.4)
BUN BLDV-MCNC: 15 MG/DL
CALCIUM SERPL-MCNC: 8.7 MG/DL
CHLORIDE BLD-SCNC: 106 MMOL/L
CO2: 30 MMOL/L
CREAT SERPL-MCNC: 0.6 MG/DL
GFR, ESTIMATED: 96
GLUCOSE BLD-MCNC: 96 MG/DL
POTASSIUM SERPL-SCNC: 4.9 MMOL/L
SODIUM BLD-SCNC: 143 MMOL/L
TOTAL PROTEIN: 5.9 G/DL (ref 6.4–8.2)

## 2025-02-19 NOTE — TELEPHONE ENCOUNTER
Linh @ Dr. PEDRO Plascencia's office called and left the following message.    Jodi Mohamud NP is wanting to confirm that you are not concerned that her phenobarb is affecting any of her blood counts.      Patient has Micro-acidic Anemia    TE Mohamud would like a call back to [phone]

## 2025-02-19 NOTE — PROGRESS NOTES
8446 - Called to discuss lab results with Jodi Mohamud, but the recording said the office was closed.  The office called to inquire if there were any concerns that treatment with phenobarbital (started 1985) could be contributing to her hematological problems.    The patient and has a history of intermittent neutropenia.  Possible medication adverse effects secondary to numerous prior medications including, but not limited to; NSAIDs (currently on treatment with ibuprofen and Meloxicam), steroids, antibiotics (during recurrent infections), antidepressants, anticonvulsants, PPI. Also possible triggers include; Viral infection, autoimmune disorder or nutritional deficiencies.      12/03/2024 GFR 88, BUN 17 and creatinine 0.7.  Ionized calcium level and hepatic function panel results within normal limits.  Copper level within normal limits.  LDH and haptoglobin levels within normal limits.  Rheumatoid factor within normal limits.  GEORGI screen was negative.  LDH and haptoglobin level within normal limits.  Vitamin B12 and folate levels within normal limits.  Iron level 108, TIBC 269, iron saturation 40% and ferritin level 89.     12/03/2024 Peripheral flow cytometry results show increased CD4:CD8 ratio (10:1) without immunophenotypic aberrancy.  These findings may be observed in Hodgkin lymphomas, autoimmune disorders, neoplasms, sarcoidosis, and other conditions.      12/03/2024 [place]/lambda free light chain ratio within normal limits.  SPEP/KERI interpretation shows a normal SPEP pattern, no monoclonal protein seen.  IgG, IgA and IgM levels within normal limits.     The patient was previously recommended a repeat evaluation and scheduled for follow-up 04/24/2025 with repeat labs

## 2025-04-23 NOTE — PROGRESS NOTES
[facility] [place] [facility]  [facility]  [address]  [address]  [place] [facility] [address]  Dept: [phone]  Loc: [phone]       Visit Date:4/24/2025     Celina Boss is a 80 y.o. female who presents today for:   Chief Complaint   Patient presents with    Follow-up     Macrocytic anemia          HPI:   Celina Boss is a 80 y.o. female with vitamin B12 deficiency and elevated MCV.  The patient has a history of seizure disorder, memory impairment, HTN, hyperlipidemia, osteopenia, osteoarthritis and GERD.  She is s/p hysterectomy.     11/08/2011 WBC 5.0, Hgb 12.1, HCT 35.6%, , RDW 13.8% and platelet count 222,000.     04/04/2023 WBC 4.3, Hgb 10.5, HCT 31% .9, RDW 13.8% and platelet count 261,000.     10/23/2023 vitamin B12 level 201.  WBC 4.2, Hgb 12.6, HCT 38.5%, .2, RDW 13% and platelet count 281,000.     02/03/2024 vitamin B12 level 410.  WBC 3.1, Hgb 12.7, HCT 38.6%, , RDW 13% and platelet count 281,000.     07/31/2024 WBC 3.2, Hgb 11.3, HCT 34.6%, .2, RDW 13.9% and platelet count 241,000.     11/07/2024 GFR 96, BUN 13 and creatinine 0.6.  Hepatic function panel results within normal limits.  WBC 2.9, Hgb 12.1, HCT 36.3%, .7, RDW 13.6% and platelet count 240,000.  Folate level within normal limits.     12/03/2024 the patient was seen for initial evaluation.  GFR 88, BUN 17 and creatinine 0.7.  Ionized calcium level and hepatic function panel results within normal limits.  Copper level within normal limits.  LDH and haptoglobin levels within normal limits.  Rheumatoid factor within normal limits.  GEORGI screen was negative.  LDH and haptoglobin level within normal limits.  Vitamin B12 and folate levels within normal limits.  Iron level 108, TIBC 269, iron saturation 40% and ferritin level 89.     Peripheral flow cytometry results show increased CD4:CD8 ratio (10:1) without immunophenotypic aberrancy.  These findings may

## 2025-04-24 ENCOUNTER — HOSPITAL ENCOUNTER (OUTPATIENT)
Dept: INFUSION THERAPY | Age: 80
Discharge: HOME | End: 2025-04-24
Payer: MEDICARE

## 2025-04-24 ENCOUNTER — OFFICE VISIT (OUTPATIENT)
Dept: ONCOLOGY | Age: 80
End: 2025-04-24
Payer: MEDICARE

## 2025-04-24 VITALS
WEIGHT: 152.4 LBS | TEMPERATURE: 97.6 F | SYSTOLIC BLOOD PRESSURE: 136 MMHG | DIASTOLIC BLOOD PRESSURE: 65 MMHG | HEART RATE: 66 BPM | RESPIRATION RATE: 16 BRPM | HEIGHT: 66 IN | BODY MASS INDEX: 24.49 KG/M2 | OXYGEN SATURATION: 98 %

## 2025-04-24 VITALS — HEART RATE: 66 BPM | TEMPERATURE: 97.6 F

## 2025-04-24 DIAGNOSIS — D53.9 MACROCYTIC ANEMIA: ICD-10-CM

## 2025-04-24 DIAGNOSIS — D53.9 MACROCYTIC ANEMIA: Primary | ICD-10-CM

## 2025-04-24 DIAGNOSIS — D72.818 OTHER DECREASED WHITE BLOOD CELL (WBC) COUNT: ICD-10-CM

## 2025-04-24 LAB
ALBUMIN SERPL BCG-MCNC: 4.1 G/DL (ref 3.4–4.9)
ALP SERPL-CCNC: 96 U/L (ref 38–126)
ALT SERPL W/O P-5'-P-CCNC: < 5 U/L (ref 10–35)
AST SERPL-CCNC: 17 U/L (ref 10–35)
BASOPHILS # BLD AUTO: 0 THOU/MM3 (ref 0–0.1)
BASOPHILS NFR BLD AUTO: 1 % (ref 0–3)
BILIRUB CONJ SERPL-MCNC: 0.2 MG/DL (ref 0–0.2)
BILIRUB SERPL-MCNC: 0.4 MG/DL (ref 0.3–1.2)
BUN BLDP-MCNC: 13 MG/DL (ref 8–26)
CHLORIDE BLD-SCNC: 104 MEQ/L (ref 98–109)
CREAT BLD-MCNC: 0.7 MG/DL (ref 0.5–1.2)
CRP SERPL-MCNC: < 0.3 MG/DL (ref 0–0.5)
EOSINOPHIL # BLD AUTO: 0.1 THOU/MM3 (ref 0–0.4)
EOSINOPHIL NFR BLD AUTO: 2 % (ref 0–4)
ERYTHROCYTE [DISTWIDTH] IN BLOOD BY AUTOMATED COUNT: 13.8 % (ref 11.5–14.5)
GFR SERPL CREATININE-BSD FRML MDRD: 86 ML/MIN/1.73M2
GLUCOSE BLD-MCNC: 90 MG/DL (ref 70–108)
HCT VFR BLD AUTO: 36.4 % (ref 37–47)
HGB BLD-MCNC: 12.2 GM/DL (ref 12–16)
HGB RETIC QN AUTO: 38.2 PG (ref 28.2–35.7)
IMM GRANULOCYTES # BLD AUTO: 0 THOU/MM3 (ref 0–0.07)
IMM GRANULOCYTES NFR BLD AUTO: 0 %
IMM RETICS NFR: 8.2 % (ref 3–15.9)
IONIZED CALCIUM, WHOLE BLOOD: 1.21 MMOL/L (ref 1.12–1.32)
LYMPHOCYTES # BLD AUTO: 0.6 THOU/MM3 (ref 1–4.8)
LYMPHOCYTES NFR BLD AUTO: 17 % (ref 15–47)
MCH RBC QN AUTO: 35.5 PG (ref 26–33)
MCHC RBC AUTO-ENTMCNC: 33.5 GM/DL (ref 32.2–35.5)
MCV RBC AUTO: 106 FL (ref 81–99)
MONOCYTES # BLD AUTO: 0.3 THOU/MM3 (ref 0.4–1.3)
MONOCYTES NFR BLD AUTO: 8 % (ref 0–12)
NEUTROPHILS ABSOLUTE: 2.4 THOU/MM3 (ref 1.8–7.7)
NEUTROPHILS NFR BLD AUTO: 72 % (ref 43–75)
PLATELET # BLD AUTO: 253 THOU/MM3 (ref 130–400)
PMV BLD AUTO: 10.4 FL (ref 9.4–12.4)
POTASSIUM BLD-SCNC: 4.2 MEQ/L (ref 3.5–4.9)
PROT SERPL-MCNC: 6.8 G/DL (ref 6.4–8.3)
RBC # BLD AUTO: 3.44 MILL/MM3 (ref 4.2–5.4)
RETICS # AUTO: 43 THOU/MM3 (ref 20–115)
RETICS/RBC NFR AUTO: 1.2 % (ref 0.5–2)
SODIUM BLD-SCNC: 140 MEQ/L (ref 138–146)
TOTAL CO2, WHOLE BLOOD: 30 MEQ/L (ref 23–33)
VIT B12 SERPL-MCNC: 1277 PG/ML (ref 232–1245)
WBC # BLD AUTO: 3.4 THOU/MM3 (ref 4.8–10.8)

## 2025-04-24 PROCEDURE — 1159F MED LIST DOCD IN RCRD: CPT | Performed by: NURSE PRACTITIONER

## 2025-04-24 PROCEDURE — 82607 VITAMIN B-12: CPT

## 2025-04-24 PROCEDURE — 85046 RETICYTE/HGB CONCENTRATE: CPT

## 2025-04-24 PROCEDURE — G8427 DOCREV CUR MEDS BY ELIG CLIN: HCPCS | Performed by: NURSE PRACTITIONER

## 2025-04-24 PROCEDURE — G8420 CALC BMI NORM PARAMETERS: HCPCS | Performed by: NURSE PRACTITIONER

## 2025-04-24 PROCEDURE — 1160F RVW MEDS BY RX/DR IN RCRD: CPT | Performed by: NURSE PRACTITIONER

## 2025-04-24 PROCEDURE — 1123F ACP DISCUSS/DSCN MKR DOCD: CPT | Performed by: NURSE PRACTITIONER

## 2025-04-24 PROCEDURE — 86140 C-REACTIVE PROTEIN: CPT

## 2025-04-24 PROCEDURE — 88185 FLOWCYTOMETRY/TC ADD-ON: CPT

## 2025-04-24 PROCEDURE — 99211 OFF/OP EST MAY X REQ PHY/QHP: CPT

## 2025-04-24 PROCEDURE — 82668 ASSAY OF ERYTHROPOIETIN: CPT

## 2025-04-24 PROCEDURE — 80076 HEPATIC FUNCTION PANEL: CPT

## 2025-04-24 PROCEDURE — G8399 PT W/DXA RESULTS DOCUMENT: HCPCS | Performed by: NURSE PRACTITIONER

## 2025-04-24 PROCEDURE — 1036F TOBACCO NON-USER: CPT | Performed by: NURSE PRACTITIONER

## 2025-04-24 PROCEDURE — 1090F PRES/ABSN URINE INCON ASSESS: CPT | Performed by: NURSE PRACTITIONER

## 2025-04-24 PROCEDURE — 99214 OFFICE O/P EST MOD 30 MIN: CPT | Performed by: NURSE PRACTITIONER

## 2025-04-24 PROCEDURE — 88184 FLOWCYTOMETRY/ TC 1 MARKER: CPT

## 2025-04-24 RX ORDER — ETODOLAC 400 MG/1
400 TABLET, FILM COATED ORAL 2 TIMES DAILY
COMMUNITY

## 2025-04-24 RX ORDER — SENNOSIDES A AND B 8.6 MG/1
1 TABLET, FILM COATED ORAL DAILY
COMMUNITY

## 2025-04-24 RX ORDER — DOCUSATE SODIUM 100 MG/1
100 CAPSULE, LIQUID FILLED ORAL 2 TIMES DAILY
COMMUNITY

## 2025-04-25 LAB
EPO SERPL-ACNC: 11 MU/ML (ref 4–27)
REVIEWED BY: NORMAL
SMEAR REVIEW: NORMAL

## 2025-04-26 LAB — LEUK/LYMPH PHENOTYPING WB: NORMAL

## (undated) DEVICE — BANDAGE COMPR M W4INXL10YD WHT BGE VELC E MTRX HK AND LOOP

## (undated) DEVICE — STERILE COTTON BALLS LARGE 5/P: Brand: MEDLINE

## (undated) DEVICE — BANDAGE,GAUZE,4.5"X4.1YD,STERILE,LF: Brand: MEDLINE

## (undated) DEVICE — GLOVE ORANGE PI 7   MSG9070

## (undated) DEVICE — GLOVE SURG SZ 8 L11.77IN FNGR THK9.8MIL STRW LTX POLYMER

## (undated) DEVICE — PACK PROCEDURE SURG PLAS SC MIN SRHP LF

## (undated) DEVICE — COTTON BALL ST

## (undated) DEVICE — Device

## (undated) DEVICE — GOWN,SIRUS,NON REINFRCD,LARGE,SET IN SL: Brand: MEDLINE

## (undated) DEVICE — SPONGE GZ W4XL4IN COT 12 PLY TYP VII WVN C FLD DSGN

## (undated) DEVICE — DRESSING,GAUZE,XEROFORM,CURAD,5"X9",ST: Brand: CURAD

## (undated) DEVICE — SOLUTION IV 1000ML 0.9% SOD CHL PH 5 INJ USP VIAFLX PLAS

## (undated) DEVICE — SUTURE NONABSORBABLE BRAIDED 4-0 SH 30 IN BLK PERMA HND K831H

## (undated) DEVICE — SUTURE MCRYL SZ 4-0 L18IN ABSRB UD P-3 L13MM 3/8 CIR PRIM Y494G